# Patient Record
Sex: FEMALE | Race: WHITE | NOT HISPANIC OR LATINO | Employment: UNEMPLOYED | URBAN - NONMETROPOLITAN AREA
[De-identification: names, ages, dates, MRNs, and addresses within clinical notes are randomized per-mention and may not be internally consistent; named-entity substitution may affect disease eponyms.]

---

## 2017-03-01 ENCOUNTER — OFFICE VISIT (OUTPATIENT)
Dept: URGENT CARE | Facility: PHYSICIAN GROUP | Age: 47
End: 2017-03-01
Payer: COMMERCIAL

## 2017-03-01 ENCOUNTER — TELEPHONE (OUTPATIENT)
Dept: URGENT CARE | Facility: PHYSICIAN GROUP | Age: 47
End: 2017-03-01

## 2017-03-01 VITALS
DIASTOLIC BLOOD PRESSURE: 86 MMHG | OXYGEN SATURATION: 97 % | RESPIRATION RATE: 20 BRPM | WEIGHT: 265 LBS | SYSTOLIC BLOOD PRESSURE: 130 MMHG | BODY MASS INDEX: 48.46 KG/M2 | HEART RATE: 105 BPM | TEMPERATURE: 97.7 F

## 2017-03-01 DIAGNOSIS — R11.2 NAUSEA AND VOMITING, INTRACTABILITY OF VOMITING NOT SPECIFIED, UNSPECIFIED VOMITING TYPE: ICD-10-CM

## 2017-03-01 DIAGNOSIS — N76.4 ABSCESS OF LABIA MAJORA: ICD-10-CM

## 2017-03-01 PROCEDURE — 99214 OFFICE O/P EST MOD 30 MIN: CPT | Performed by: PHYSICIAN ASSISTANT

## 2017-03-01 RX ORDER — SULFAMETHOXAZOLE AND TRIMETHOPRIM 800; 160 MG/1; MG/1
1 TABLET ORAL 2 TIMES DAILY
Qty: 20 TAB | Refills: 0 | Status: SHIPPED | OUTPATIENT
Start: 2017-03-01 | End: 2019-02-05

## 2017-03-01 RX ORDER — ONDANSETRON 4 MG/1
4 TABLET, ORALLY DISINTEGRATING ORAL EVERY 8 HOURS PRN
Qty: 20 TAB | Refills: 0 | Status: SHIPPED | OUTPATIENT
Start: 2017-03-01 | End: 2019-02-05

## 2017-03-01 NOTE — MR AVS SNAPSHOT
Gricel Schwab   3/1/2017 12:25 PM   Office Visit   MRN: [id number]    Department:  [facility]   Dept Phone:  [phone]    Description:  Female : 1970   Provider:  Sherie Gonzalez PA-C           Reason for Visit     Cyst vaginal, x5 days      Allergies as of 3/1/2017     Allergen Noted Reactions    Latex 2016       Oxycodone 10/27/2014   Itching    Sesame Oil 2016       hives    Tape 10/27/2014   Hives    Surgical tape; bandaids (blisters)      You were diagnosed with     Abscess of labia majora   [[id number]]         Vital Signs     Blood Pressure Pulse Temperature Respirations Weight Oxygen Saturation    130/86 mmHg 105 36.5 °C (97.7 °F) 20 120.203 kg (265 lb) 97%    Smoking Status                   Former Smoker           Basic Information     Date Of Birth Sex Race Ethnicity Preferred Language    1970 Female White Non- English      Problem List              ICD-10-CM Priority Class Noted - Resolved    Complete rupture of rotator cuff M75.120   10/30/2014 - Present      Health Maintenance        Date Due Completion Dates    IMM DTaP/Tdap/Td Vaccine (1 - Tdap) 1989 ---    PAP SMEAR 1991 ---    MAMMOGRAM 2010 ---    IMM INFLUENZA (1) 2016 ---            Current Immunizations     No immunizations on file.      Below and/or attached are the medications your provider expects you to take. Review all of your home medications and newly ordered medications with your provider and/or pharmacist. Follow medication instructions as directed by your provider and/or pharmacist. Please keep your medication list with you and share with your provider. Update the information when medications are discontinued, doses are changed, or new medications (including over-the-counter products) are added; and carry medication information at all times in the event of emergency situations     Allergies:  LATEX - (reactions not documented)     OXYCODONE - Itching     SESAME OIL -  (reactions not documented)     TAPE - Hives               Medications  Valid as of: March 01, 2017 -  3:00 PM    Generic Name Brand Name Tablet Size Instructions for use    Citalopram Hydrobromide (Tab) CELEXA 10 MG Take 10 mg by mouth every day.        Esomeprazole Magnesium (CAPSULE DELAYED RELEASE) NEXIUM 40 MG Take 40 mg by mouth every day.        GlyBURIDE (Tab) DIABETA 2.5 MG Take 2.5 mg by mouth 2 Times a Day.        Lidocaine HCl (Solution) XYLOCAINE 2 % Apply 5ml topically to the external genitalia as needed for pain        Linagliptin (Tab) TRADJENTA 5 MG Take 5 mg by mouth every day.        MetFORMIN HCl (TABLET SR 24 HR) GLUMETZA 1000 MG Take  by mouth every day.        Ondansetron HCl (Tab) ZOFRAN 4 MG Take 1 Tab by mouth every four hours as needed.        Sulfamethoxazole-Trimethoprim (Tab) BACTRIM -160 MG Take 1 Tab by mouth 2 times a day.        .                 Medicines prescribed today were sent to:     [facility] [address] [place] [address] [address] [facility] [address] [place] [address] & [place]    [address] [facility] [address] [facility] [address]    Phone: [phone] Fax: [phone]    Open 24 Hours?: No      Medication refill instructions:       If your prescription bottle indicates you have medication refills left, it is not necessary to call your provider’s office. Please contact your pharmacy and they will refill your medication.    If your prescription bottle indicates you do not have any refills left, you may request refills at any time through one of the following ways: The online [employer] system (except Urgent Care), by calling your provider’s office, or by asking your pharmacy to contact your provider’s office with a refill request. Medication refills are processed only during regular business hours and may not be available until the next business day. Your provider may request additional information or to have a follow-up visit with you prior to refilling your medication.   *Please  Note: Medication refills are assigned a new Rx number when refilled electronically. Your pharmacy may indicate that no refills were authorized even though a new prescription for the same medication is available at the pharmacy. Please request the medicine by name with the pharmacy before contacting your provider for a refill.           CoTweet Access Code: [email or web address]  Expires: 3/31/2017 12:22 PM    CoTweet  A secure, online tool to manage your health information     [employer]’s CoTweet® is a secure, online tool that connects you to your personalized health information from the privacy of your home -- day or night - making it very easy for you to manage your healthcare. Once the activation process is completed, you can even access your medical information using the CoTweet shanon, which is available for free in the Apple Shanon store or Google Play store.     CoTweet provides the following levels of access (as shown below):   My Chart Features   [facility] Primary Care Doctor [facility]  Specialists [facility]  Urgent  Care Non-[facility]  Primary Care  Doctor   Email your healthcare team securely and privately 24/7 X X X    Manage appointments: schedule your next appointment; view details of past/upcoming appointments X      Request prescription refills. X      View recent personal medical records, including lab and immunizations X X X X   View health record, including health history, allergies, medications X X X X   Read reports about your outpatient visits, procedures, consult and ER notes X X X X   See your discharge summary, which is a recap of your hospital and/or ER visit that includes your diagnosis, lab results, and care plan. X X       How to register for CoTweet:  1. Go to  [email or web address].  2. Click on the Sign Up Now box, which takes you to the New Member Sign Up page. You will need to provide the following information:  a. Enter your CoTweet Access Code exactly as it appears at the top of this page.  (You will not need to use this code after you’ve completed the sign-up process. If you do not sign up before the expiration date, you must request a new code.)   b. Enter your date of birth.   c. Enter your home email address.   d. Click Submit, and follow the next screen’s instructions.  3. Create a Cognitum ID. This will be your Cognitum login ID and cannot be changed, so think of one that is secure and easy to remember.  4. Create a Cognitum password. You can change your password at any time.  5. Enter your Password Reset Question and Answer. This can be used at a later time if you forget your password.   6. Enter your e-mail address. This allows you to receive e-mail notifications when new information is available in Cognitum.  7. Click Sign Up. You can now view your health information.    For assistance activating your Cognitum account, call [phone]

## 2017-03-01 NOTE — PROGRESS NOTES
Chief Complaint   Patient presents with   • Cyst     vaginal, x5 days       HISTORY OF PRESENT ILLNESS: Patient is a 47 y.o. female who presents today for evaluation of a painful area on her left labia majora. She noticed it Saturday night, 2/25. Her  pulled a hair out of the middle of it and it has become progressively more painful and swollen since then. She denies fever, night sweats, chills, drainage from the site. She denies any history of the same symptoms. She denies any vaginal discharge and pain with intercourse.    Patient Active Problem List    Diagnosis Date Noted   • Complete rupture of rotator cuff 10/30/2014       Allergies:Latex; Oxycodone; Sesame oil; and Tape    Current Outpatient Prescriptions Ordered in [facility]   Medication Sig Dispense Refill   • sulfamethoxazole-trimethoprim (BACTRIM DS) 800-160 MG tablet Take 1 Tab by mouth 2 times a day. 20 Tab 0   • lidocaine viscous 2% (XYLOCAINE) 2 % Solution Apply 5ml topically to the external genitalia as needed for pain 100 Bottle 0   • ondansetron (ZOFRAN) 4 MG Tab tablet Take 1 Tab by mouth every four hours as needed. 20 Tab 0   • Metformin HCl 1000 MG TB24 Take  by mouth every day.     • citalopram (CELEXA) 10 MG tablet Take 10 mg by mouth every day.     • esomeprazole (NEXIUM) 40 MG delayed-release capsule Take 40 mg by mouth every day.     • linagliptin (TRADJENTA) 5 MG TABS tablet Take 5 mg by mouth every day.     • glyBURIDE (DIABETA) 2.5 MG TABS Take 2.5 mg by mouth 2 Times a Day.       No current [facility]-ordered facility-administered medications on file.       Past Medical History   Diagnosis Date   • Type II or unspecified type diabetes mellitus without mention of complication, not stated as uncontrolled      oral agents   • Asthma      as child only   • Heart burn    • Pain      right shoulder   • Psychiatric problem      depression   • Sleep apnea      CPAP   • Snoring        Social History   Substance Use Topics   • Smoking status: Former  Smoker -- 0.50 packs/day for 15 years     Quit date: 01/01/2010   • Smokeless tobacco: Not on file   • Alcohol Use: No       No family status information on file.     Family History   Problem Relation Age of Onset   • Diabetes     • Cancer     • Stroke     • Lung Disease     • Heart Disease     • Hypertension         ROS:   Review of Systems   Constitutional: Negative for fever, chills, weight loss and malaise/fatigue.   HENT: Negative for ear pain, nosebleeds, congestion, sore throat and neck pain.    Eyes: Negative for blurred vision.   Respiratory: Negative for cough, sputum production, shortness of breath and wheezing.    Cardiovascular: Negative for chest pain, palpitations, orthopnea and leg swelling.   Gastrointestinal: Negative for heartburn, nausea, vomiting and abdominal pain.   Genitourinary: Negative for dysuria, urgency and frequency.       Exam:  Blood pressure 130/86, pulse 105, temperature 36.5 °C (97.7 °F), resp. rate 20, weight 120.203 kg (265 lb), SpO2 97 %.  General: Normal appearing. No distress.  HEENT:  Head is grossly normal.  Pulmonary: No respiratory distress noted.  Skin:  1.5 cm area of induration on the external surface of the left labia majora. It is tender to palpation with mild erythema. There is no fluctuance noted.  Psych: Normal mood. Alert and oriented x3. Judgment and insight is normal.    I&D not warranted at this time    Assessment/Plan:  Take all medication as directed. Apply heat to the affected area. Discussed appropriate over-the-counter symptomatic medication for pain. Follow-up for worsening or persistent symptoms.  1. Abscess of labia majora  sulfamethoxazole-trimethoprim (BACTRIM DS) 800-160 MG tablet    lidocaine viscous 2% (XYLOCAINE) 2 % Solution

## 2017-03-02 NOTE — TELEPHONE ENCOUNTER
1. Caller Name: Gricel                      Call Back Number: [phone]    2. Message: Patient took her ABX and 30 minutes later she vomited.  She is wondering what else to do.    3. Patient approves office to leave a detailed voicemail/MyChart message: N\A

## 2017-03-02 NOTE — TELEPHONE ENCOUNTER
I sent some nausea medication to the pharmacy. Have her take that 30-45 minutes prior to taking the antibiotic. Follow up for worsening or persistent symptoms.

## 2018-08-24 ENCOUNTER — APPOINTMENT (RX ONLY)
Dept: URBAN - METROPOLITAN AREA CLINIC 4 | Facility: CLINIC | Age: 48
Setting detail: DERMATOLOGY
End: 2018-08-24

## 2018-08-24 DIAGNOSIS — D22 MELANOCYTIC NEVI: ICD-10-CM

## 2018-08-24 DIAGNOSIS — Z71.89 OTHER SPECIFIED COUNSELING: ICD-10-CM

## 2018-08-24 DIAGNOSIS — L81.4 OTHER MELANIN HYPERPIGMENTATION: ICD-10-CM

## 2018-08-24 DIAGNOSIS — L82.1 OTHER SEBORRHEIC KERATOSIS: ICD-10-CM

## 2018-08-24 PROBLEM — L94.5: Status: ACTIVE | Noted: 2018-08-24

## 2018-08-24 PROBLEM — D22.5 MELANOCYTIC NEVI OF TRUNK: Status: ACTIVE | Noted: 2018-08-24

## 2018-08-24 PROCEDURE — ? DIAGNOSIS COMMENT

## 2018-08-24 PROCEDURE — 99203 OFFICE O/P NEW LOW 30 MIN: CPT

## 2018-08-24 PROCEDURE — ? COUNSELING

## 2018-08-24 ASSESSMENT — LOCATION DETAILED DESCRIPTION DERM
LOCATION DETAILED: INFERIOR MID FOREHEAD
LOCATION DETAILED: LEFT ANTERIOR SHOULDER
LOCATION DETAILED: RIGHT ANTERIOR PROXIMAL UPPER ARM
LOCATION DETAILED: EPIGASTRIC SKIN
LOCATION DETAILED: INFERIOR THORACIC SPINE
LOCATION DETAILED: LEFT MEDIAL UPPER BACK
LOCATION DETAILED: RIGHT CENTRAL ZYGOMA

## 2018-08-24 ASSESSMENT — LOCATION SIMPLE DESCRIPTION DERM
LOCATION SIMPLE: RIGHT ZYGOMA
LOCATION SIMPLE: UPPER BACK
LOCATION SIMPLE: RIGHT UPPER ARM
LOCATION SIMPLE: LEFT SHOULDER
LOCATION SIMPLE: INFERIOR FOREHEAD
LOCATION SIMPLE: ABDOMEN
LOCATION SIMPLE: LEFT UPPER BACK

## 2018-08-24 ASSESSMENT — LOCATION ZONE DERM
LOCATION ZONE: ARM
LOCATION ZONE: TRUNK
LOCATION ZONE: FACE

## 2019-02-05 ENCOUNTER — OFFICE VISIT (OUTPATIENT)
Dept: URGENT CARE | Facility: PHYSICIAN GROUP | Age: 49
End: 2019-02-05
Payer: COMMERCIAL

## 2019-02-05 VITALS
DIASTOLIC BLOOD PRESSURE: 86 MMHG | SYSTOLIC BLOOD PRESSURE: 138 MMHG | RESPIRATION RATE: 16 BRPM | WEIGHT: 202 LBS | TEMPERATURE: 97.5 F | OXYGEN SATURATION: 99 % | HEART RATE: 76 BPM | BODY MASS INDEX: 36.95 KG/M2

## 2019-02-05 DIAGNOSIS — N39.0 ACUTE URINARY TRACT INFECTION: ICD-10-CM

## 2019-02-05 LAB
APPEARANCE UR: NORMAL
BILIRUB UR STRIP-MCNC: NEGATIVE MG/DL
COLOR UR AUTO: NORMAL
GLUCOSE UR STRIP.AUTO-MCNC: NEGATIVE MG/DL
KETONES UR STRIP.AUTO-MCNC: NEGATIVE MG/DL
LEUKOCYTE ESTERASE UR QL STRIP.AUTO: NORMAL
NITRITE UR QL STRIP.AUTO: POSITIVE
PH UR STRIP.AUTO: 6 [PH] (ref 5–8)
PROT UR QL STRIP: 30 MG/DL
RBC UR QL AUTO: NORMAL
SP GR UR STRIP.AUTO: 1.02
UROBILINOGEN UR STRIP-MCNC: 0.2 MG/DL

## 2019-02-05 PROCEDURE — 99214 OFFICE O/P EST MOD 30 MIN: CPT | Performed by: FAMILY MEDICINE

## 2019-02-05 PROCEDURE — 81002 URINALYSIS NONAUTO W/O SCOPE: CPT | Performed by: FAMILY MEDICINE

## 2019-02-05 RX ORDER — NITROFURANTOIN 25; 75 MG/1; MG/1
100 CAPSULE ORAL EVERY 12 HOURS
Qty: 10 CAP | Refills: 0 | Status: SHIPPED | OUTPATIENT
Start: 2019-02-05 | End: 2019-02-10

## 2019-04-23 ENCOUNTER — HOSPITAL ENCOUNTER (EMERGENCY)
Facility: MEDICAL CENTER | Age: 49
End: 2019-04-23
Payer: COMMERCIAL

## 2021-02-23 NOTE — PROGRESS NOTES
Chief Complaint:    Chief Complaint   Patient presents with   • UTI     pain upon urination        History of Present Illness:    This is a new problem. Symptoms since last night. Has dysuria. No urinary frequency, urinary urgency, or hematuria. Feels like typical UTI. Last UTI was about 4-5 years ago, cannot recall antibiotic used. She has only had one prior UTI.      Review of Systems:    Constitutional: Negative for fever, chills, and diaphoresis.   Eyes: Negative for change in vision, photophobia, pain, redness, and discharge.  ENT: Negative for ear pain, ear discharge, hearing loss, tinnitus, nasal congestion, nosebleeds, and sore throat.    Respiratory: Negative for cough, hemoptysis, sputum production, shortness of breath, wheezing, and stridor.    Cardiovascular: Negative for chest pain, palpitations, orthopnea, claudication, leg swelling, and PND.   Gastrointestinal: Negative for abdominal pain, nausea, vomiting, diarrhea, constipation, blood in stool, and melena.   Genitourinary: See HPI.  Musculoskeletal: Negative for myalgias, joint pain, neck pain, and back pain.   Skin: Negative for rash and itching.   Neurological: Negative for dizziness, tingling, tremors, sensory change, speech change, focal weakness, seizures, loss of consciousness, and headaches.   Endo: Diabetic, on medication.  Heme: Does not bruise/bleed easily.   Psychiatric/Behavioral: No new symptoms.      Past Medical History:    Past Medical History:   Diagnosis Date   • Asthma     as child only   • Heart burn    • Pain     right shoulder   • Psychiatric problem     depression   • Sleep apnea     CPAP   • Snoring    • Type II or unspecified type diabetes mellitus without mention of complication, not stated as uncontrolled     oral agents     Past Surgical History:    Past Surgical History:   Procedure Laterality Date   • SHOULDER DECOMPRESSION ARTHROSCOPIC  10/30/2014    Performed by Enrique Galloway M.D. at [facility]   •  [FreeTextEntry1] : Needs Pennsaid and Lidocaine patch CLAVICLE DISTAL EXCISION  10/30/2014    Performed by Enrique Galloway M.D. at [facility]   • ARTHROSCOPIC LABRAL DEBRIDEMENT  10/30/2014    Performed by Enrique Galloway M.D. at SURGERY [facility]   • LORNA BY LAPAROSCOPY  2011   • ANKLE ARTHROTOMY  2010    right   • SHOULDER ARTHROSCOPY  2009    left   • LUMBAR LAMINECTOMY DISKECTOMY  2004     Social History:    Social History     Social History   • Marital status:      Spouse name: N/A   • Number of children: N/A   • Years of education: N/A     Occupational History   • Not on file.     Social History Main Topics   • Smoking status: Former Smoker     Packs/day: 0.50     Years: 15.00     Quit date: 1/1/2010   • Smokeless tobacco: Never Used   • Alcohol use No   • Drug use: No   • Sexual activity: Not on file     Other Topics Concern   • Not on file     Social History Narrative   • No narrative on file     Family History:    Family History   Problem Relation Age of Onset   • Diabetes Unknown    • Cancer Unknown    • Stroke Unknown    • Lung Disease Unknown    • Heart Disease Unknown    • Hypertension Unknown      Medications:    Current Outpatient Prescriptions on File Prior to Visit   Medication Sig Dispense Refill   • esomeprazole (NEXIUM) 40 MG delayed-release capsule Take 40 mg by mouth every day.     • Metformin HCl 1000 MG TB24 Take  by mouth every day.     • citalopram (CELEXA) 10 MG tablet Take 10 mg by mouth every day.     • linagliptin (TRADJENTA) 5 MG TABS tablet Take 5 mg by mouth every day.     • glyBURIDE (DIABETA) 2.5 MG TABS Take 2.5 mg by mouth 2 Times a Day.       No current facility-administered medications on file prior to visit.      Allergies:    Allergies   Allergen Reactions   • Latex    • Oxycodone Itching   • Sesame Oil      hives   • Tape Hives     Surgical tape; bandaids (blisters)       Vitals:    Vitals:    02/05/19 1101   BP: 138/86   Pulse: 76   Resp: 16   Temp: 36.4 °C (97.5 °F)   SpO2: 99%   Weight: 91.6 kg  (202 lb)       Physical Exam:    Constitutional: Vital signs reviewed. Appears well-developed and well-nourished. No acute distress.   Eyes: Sclera white, conjunctivae clear.   ENT: External ears normal. Hearing normal.  Neck: Neck supple.   Pulmonary/Chest: Respirations non-labored.   Abdomen: Bowel sounds are normal active. Soft, non-distended, and non-tender to palpation.    Musculoskeletal: No CVA TTP bilaterally. Normal gait. Normal range of motion. No muscular atrophy or weakness.  Neurological: Alert and oriented to person, place, and time. Muscle tone normal. Coordination normal. Light touch and sensation normal.   Skin: No rashes or lesions. Warm, dry, normal turgor.  Psychiatric: Normal mood and affect. Behavior is normal. Judgment and thought content normal.     Diagnostics:    POCT URINALYSIS (Order #[id number]) on 2/5/19   Component Results     Component Value Ref Range & Units Status   POC Color DARK YELLOW  Negative Final   POC Appearance CLOUDY  Negative Final   POC Leukocyte Esterase MODERATE  Negative Final   POC Nitrites POSITIVE  Negative Final   POC Urobiligen 0.2  Negative (0.2) mg/dL Final   POC Protein 30  Negative mg/dL Final   POC Urine PH 6.0  5.0 - 8.0 Final   POC Blood LARGE  Negative Final   POC Specific Gravity 1.020  <1.005 - >1.030 Final   POC Ketones NEGATIVE  Negative mg/dL Final   POC Bilirubin NEGATIVE  Negative mg/dL Final   POC Glucose NEGATIVE  Negative mg/dL Final   Last Resulted Time   Tue Feb 5, 2019 11:27 AM       Assessment / Plan:    1. Acute urinary tract infection  - POCT Urinalysis  - nitrofurantoin monohydr macro (MACROBID) 100 MG Cap; Take 1 Cap by mouth every 12 hours for 5 days.  Dispense: 10 Cap; Refill: 0      Discussed with her DDX, management options, and risks, benefits, and alternatives to treatment plan agreed upon.    Declines urine culture.    May use OTC Azo prn.    Agreeable to medication prescribed.    Discussed expected course of duration, time for  improvement, and to seek follow-up in Emergency Room, urgent care, or with PCP if getting worse at any time or not improving within expected time frame.

## 2021-08-10 ENCOUNTER — OFFICE VISIT (OUTPATIENT)
Dept: URGENT CARE | Facility: PHYSICIAN GROUP | Age: 51
End: 2021-08-10
Payer: COMMERCIAL

## 2021-08-10 VITALS
SYSTOLIC BLOOD PRESSURE: 132 MMHG | TEMPERATURE: 98.5 F | OXYGEN SATURATION: 97 % | WEIGHT: 163 LBS | HEIGHT: 64 IN | BODY MASS INDEX: 27.83 KG/M2 | HEART RATE: 75 BPM | RESPIRATION RATE: 18 BRPM | DIASTOLIC BLOOD PRESSURE: 82 MMHG

## 2021-08-10 DIAGNOSIS — R07.89 CHEST TIGHTNESS: ICD-10-CM

## 2021-08-10 DIAGNOSIS — R06.02 SOB (SHORTNESS OF BREATH): ICD-10-CM

## 2021-08-10 DIAGNOSIS — J45.901 EXACERBATION OF ASTHMA, UNSPECIFIED ASTHMA SEVERITY, UNSPECIFIED WHETHER PERSISTENT: ICD-10-CM

## 2021-08-10 PROCEDURE — 99214 OFFICE O/P EST MOD 30 MIN: CPT | Performed by: PHYSICIAN ASSISTANT

## 2021-08-10 RX ORDER — ALBUTEROL SULFATE 90 UG/1
2 AEROSOL, METERED RESPIRATORY (INHALATION) EVERY 6 HOURS PRN
Qty: 8.5 G | Refills: 2 | Status: SHIPPED | OUTPATIENT
Start: 2021-08-10

## 2021-08-10 RX ORDER — PREDNISONE 10 MG/1
TABLET ORAL
Qty: 15 TABLET | Refills: 0 | Status: SHIPPED | OUTPATIENT
Start: 2021-08-10 | End: 2022-06-20

## 2021-08-10 RX ORDER — ALBUTEROL SULFATE 2.5 MG/3ML
2.5 SOLUTION RESPIRATORY (INHALATION) EVERY 4 HOURS PRN
Qty: 30 EACH | Refills: 0 | Status: SHIPPED | OUTPATIENT
Start: 2021-08-10

## 2021-08-10 RX ORDER — VENLAFAXINE HYDROCHLORIDE 75 MG/1
75 CAPSULE, EXTENDED RELEASE ORAL DAILY
COMMUNITY
Start: 2021-05-28 | End: 2021-08-10

## 2021-08-10 NOTE — PROGRESS NOTES
"Chief Complaint   Patient presents with   • Asthma     excerbation d/t smoke--needs a refill inhaler       HISTORY OF PRESENT ILLNESS: Patient is a 51 y.o. female who presents today for the following:    SOB x 1 week  Chest tightness  History of asthma, on medication  Patient's father and sister both had \" maker heart attacks\" at age 35  Denies fever, chills, body aches    Patient Active Problem List    Diagnosis Date Noted   • Complete rupture of rotator cuff 10/30/2014       Allergies:Latex, Oxycodone, Sesame oil, and Tape    Current Outpatient Medications Ordered in Epic   Medication Sig Dispense Refill   • albuterol (PROVENTIL) 2.5mg/3ml Nebu Soln solution for nebulization Take 3 mL by nebulization every four hours as needed for Shortness of Breath. 30 Each 0   • albuterol 108 (90 Base) MCG/ACT Aero Soln inhalation aerosol Inhale 2 Puffs every 6 hours as needed for Shortness of Breath. 8.5 g 2   • ipratropium (ATROVENT) 0.02 % Solution Take 1 mL by nebulization every four hours as needed (shortness of breath). 30 Each 0   • predniSONE (DELTASONE) 10 MG Tab 50 mg x 1 day; 40 mg x 1 day; 30 mg x 1 day; 20 mg x 1 day; 10 mg x 1 day 15 tablet 0     No current Epic-ordered facility-administered medications on file.       Past Medical History:   Diagnosis Date   • Asthma     as child only   • Heart burn    • Pain     right shoulder   • Psychiatric problem     depression   • Sleep apnea     CPAP   • Snoring    • Type II or unspecified type diabetes mellitus without mention of complication, not stated as uncontrolled     oral agents       Social History     Tobacco Use   • Smoking status: Former Smoker     Packs/day: 0.50     Years: 15.00     Pack years: 7.50     Quit date: 2010     Years since quittin.6   • Smokeless tobacco: Never Used   Substance Use Topics   • Alcohol use: No   • Drug use: No       Family Status   Relation Name Status   • Unknown  (Not Specified)   • Unknown  (Not Specified)   • " "Unknown  (Not Specified)   • Unknown  (Not Specified)   • Unknown  (Not Specified)   • Unknown  (Not Specified)     Family History   Problem Relation Age of Onset   • Diabetes Unknown    • Cancer Unknown    • Stroke Unknown    • Lung Disease Unknown    • Heart Disease Unknown    • Hypertension Unknown        Review of Systems:   Constitutional ROS: No unexpected change in weight  Pulmonary ROS: No chronic cough, sputum, or hemoptysis, No dyspnea on exertion, No wheezing  Cardiovascular ROS: No diaphoresis, No edema, No palpitations  Hematologic/Lymphatic ROS: No chills, No night sweats, No weight loss  Skin/Integumentary ROS: No edema, No evidence of rash, No itching      Exam:  /82   Pulse 75   Temp 36.9 °C (98.5 °F)   Resp 18   Ht 1.626 m (5' 4\")   Wt 73.9 kg (163 lb)   SpO2 97%   General: Well developed, well nourished. No distress.    Eye: PERRL/EOMI; conjunctivae clear, lids normal.  ENMT: Lips without lesions, MMM. Oropharynx is clear. Bilateral TMs are within normal limits.  Pulmonary: Unlabored respiratory effort. Lungs clear to auscultation, no wheezes, no rhonchi.    Cardiovascular: Regular rate and rhythm without murmur.   Neurologic: Grossly nonfocal. No facial asymmetry noted.  Lymph: No cervical lymphadenopathy noted.  Skin: Warm, dry, good turgor. No rashes in visible areas.   Psych: Normal mood. Alert and oriented to person, place and time.    EKG, per my interpretation: Normal sinus rhythm, bradycardic rate.  Normal axis.  No ST elevation/depression.    Assessment/Plan:  HPI, exam findings are consistent with asthma exacerbation.  Patient declines cardiology referral at this time but will follow up with her primary care provider.  Use all medication as directed.  1. Exacerbation of asthma, unspecified asthma severity, unspecified whether persistent  albuterol (PROVENTIL) 2.5mg/3ml Nebu Soln solution for nebulization    albuterol 108 (90 Base) MCG/ACT Aero Soln inhalation aerosol    " ipratropium (ATROVENT) 0.02 % Solution   2. SOB (shortness of breath)  albuterol (PROVENTIL) 2.5mg/3ml Nebu Soln solution for nebulization    albuterol 108 (90 Base) MCG/ACT Aero Soln inhalation aerosol    ipratropium (ATROVENT) 0.02 % Solution   3. Chest tightness  predniSONE (DELTASONE) 10 MG Tab

## 2022-06-20 ENCOUNTER — OFFICE VISIT (OUTPATIENT)
Dept: URGENT CARE | Facility: PHYSICIAN GROUP | Age: 52
End: 2022-06-20
Payer: COMMERCIAL

## 2022-06-20 VITALS
HEIGHT: 68 IN | TEMPERATURE: 97.3 F | OXYGEN SATURATION: 100 % | DIASTOLIC BLOOD PRESSURE: 68 MMHG | RESPIRATION RATE: 14 BRPM | WEIGHT: 164 LBS | BODY MASS INDEX: 24.86 KG/M2 | SYSTOLIC BLOOD PRESSURE: 114 MMHG | HEART RATE: 66 BPM

## 2022-06-20 DIAGNOSIS — G47.30 SLEEP APNEA, UNSPECIFIED TYPE: ICD-10-CM

## 2022-06-20 DIAGNOSIS — U07.1 COVID-19: ICD-10-CM

## 2022-06-20 DIAGNOSIS — J45.20 MILD INTERMITTENT ASTHMA WITHOUT COMPLICATION: ICD-10-CM

## 2022-06-20 PROCEDURE — 99213 OFFICE O/P EST LOW 20 MIN: CPT | Performed by: FAMILY MEDICINE

## 2022-06-20 NOTE — PROGRESS NOTES
"  Subjective:      52 y.o. female presents to urgent care for COVID-19. She tested positive on a home test yesterday, but symptoms started on Friday. She is experiencing increased sinus pressure, headache, body aches, fever, cough, sore throat, and diarrhea. She has been using airborne and Tylenol with minimal relief in symptoms. She does have sleep apnea which she uses a CPAP for. She also has asthma for which she takes Atrovent and Albuterol. She denies any tobacco product use.  She is fully vaccinated against COVID.  She has had no known sick contacts.    She denies any other questions or concerns at this time.    Current problem list, medication, and past medical/surgical history were reviewed in Epic.    ROS  See HPI     Objective:      /68 (BP Location: Left arm, Patient Position: Sitting, BP Cuff Size: Adult)   Pulse 66   Temp 36.3 °C (97.3 °F) (Temporal)   Resp 14   Ht 1.727 m (5' 8\")   Wt 74.4 kg (164 lb)   SpO2 100%   BMI 24.94 kg/m²     Physical Exam  Constitutional:       General: She is not in acute distress.     Appearance: She is not diaphoretic.   Cardiovascular:      Rate and Rhythm: Normal rate and regular rhythm.      Heart sounds: Normal heart sounds.   Pulmonary:      Effort: Pulmonary effort is normal. No respiratory distress.      Breath sounds: Normal breath sounds.   Neurological:      Mental Status: She is alert.   Psychiatric:         Mood and Affect: Affect normal.         Judgment: Judgment normal.       Assessment/Plan:     1. COVID-19  2. Mild intermittent asthma without complication  3. Sleep apnea, unspecified type  Paxlovid (nirmatrelvir and ritonavir) is an FDA emergency use authorization drug intended for COVID-positive patients at high risk for severe disease including hospital and death; not FDA approved. Patient meets qualifications for this medication due to their history of asthma and sleep apnea. Some adverse effects include: Hypersensitivity reaction, " anaphylaxis, hepatotoxicity, hepatitis, jaundice, diarrhea and / or others up to and including death. We only have one GFR on file from 10/30/201, it was >60.   - Nirmatrelvir & Ritonavir 20 x 150 MG & 10 x 100MG Tablet Therapy Pack; Take 300 mg nirmatrelvir (two 150 mg tablets) with 100 mg  ritonavir (one 100 mg tablet) by mouth, with all three tablets taken together  twice daily for 5 days.  Dispense: 30 Each; Refill: 0        Instructed to return to Urgent Care or nearest Emergency Department if symptoms fail to improve, for any change in condition, further concerns, or new concerning symptoms. Patient states understanding of the plan of care and discharge instructions.    Katia Bergeron M.D.

## 2022-09-23 ENCOUNTER — OFFICE VISIT (OUTPATIENT)
Dept: URGENT CARE | Facility: PHYSICIAN GROUP | Age: 52
End: 2022-09-23
Payer: COMMERCIAL

## 2022-09-23 VITALS
DIASTOLIC BLOOD PRESSURE: 94 MMHG | OXYGEN SATURATION: 98 % | SYSTOLIC BLOOD PRESSURE: 132 MMHG | RESPIRATION RATE: 16 BRPM | TEMPERATURE: 98.1 F | HEART RATE: 67 BPM | WEIGHT: 184 LBS | BODY MASS INDEX: 27.98 KG/M2

## 2022-09-23 DIAGNOSIS — L50.9 URTICARIA: ICD-10-CM

## 2022-09-23 PROCEDURE — 99213 OFFICE O/P EST LOW 20 MIN: CPT | Performed by: STUDENT IN AN ORGANIZED HEALTH CARE EDUCATION/TRAINING PROGRAM

## 2022-09-23 RX ORDER — VENLAFAXINE HYDROCHLORIDE 75 MG/1
75 CAPSULE, EXTENDED RELEASE ORAL DAILY
COMMUNITY
Start: 2022-09-16

## 2022-09-23 RX ORDER — PREDNISONE 20 MG/1
20 TABLET ORAL DAILY
Qty: 5 TABLET | Refills: 0 | Status: SHIPPED | OUTPATIENT
Start: 2022-09-23 | End: 2022-09-28

## 2022-09-23 RX ORDER — HYDROXYZINE HYDROCHLORIDE 25 MG/1
25 TABLET, FILM COATED ORAL 3 TIMES DAILY PRN
Qty: 30 TABLET | Refills: 0 | Status: SHIPPED | OUTPATIENT
Start: 2022-09-23

## 2022-09-23 RX ORDER — SOD SULF/POT CHLORIDE/MAG SULF 1.479 G
TABLET ORAL
COMMUNITY
Start: 2022-09-21

## 2022-09-23 NOTE — PROGRESS NOTES
Subjective:   Gricel Schwab is a 52 y.o. female who presents for Urticaria (X2days )      HPI:  Pleasant 52-year-old female presents the clinic for 2 days of hives.  Patient reports that she has had this in the past after having a salad wrap and believes it is due to sesame oil.  She recently had a lettuce wrap 2 days ago and she believes that had sesame oil in it as well.  She started developing hives a couple hours after having this meal.  She reports that the rash is very itchy and is diffusely throughout her body.  She denies fever, chills, cough, sputum production, shortness of breath, wheezing, facial swelling, throat swelling, nausea, vomiting, abdominal pain, chest pain, palpitations, lower leg swelling, dizziness, or headache.  She has taken 3 doses of Benadryl with mild relief from her itching.        Medications:    albuterol Aers  albuterol Nebu  hydrOXYzine HCl Tabs  ipratropium Soln  predniSONE Tabs  Sutab Tabs  venlafaxine XR Cp24    Allergies: Latex, Oxycodone, Sesame oil, and Tape    Problem List: Gricel Schwab does not have any pertinent problems on file.    Surgical History:  Past Surgical History:   Procedure Laterality Date    SHOULDER DECOMPRESSION ARTHROSCOPIC  10/30/2014    Performed by Enrique Galloway M.D. at SURGERY [facility]    CLAVICLE DISTAL EXCISION  10/30/2014    Performed by Enrique Galloway M.D. at SURGERY [facility]    DEBRIDEMENT, LABRUM, HIP, ARTHROSCOPIC  10/30/2014    Performed by Enrique Galloway M.D. at SURGERY [facility]    LORNA BY LAPAROSCOPY  2011    ANKLE ARTHROTOMY  2010    right    SHOULDER ARTHROSCOPY  2009    left    LUMBAR LAMINECTOMY DISKECTOMY  2004       Past Social Hx: Gricel Schwab  reports that she quit smoking about 12 years ago. She has a 7.50 pack-year smoking history. She has never used smokeless tobacco. She reports that she does not drink alcohol and does not use drugs.     Past Family Hx:  Gricel Schwab family history includes Cancer in  her unknown relative; Diabetes in her unknown relative; Heart Disease in her unknown relative; Hypertension in her unknown relative; Lung Disease in her unknown relative; Stroke in her unknown relative.     Problem list, medications, and allergies reviewed by myself today in Epic.     Objective:     BP (!) 132/94   Pulse 67   Temp 36.7 °C (98.1 °F) (Temporal)   Resp 16   Wt 83.5 kg (184 lb)   SpO2 98%   BMI 27.98 kg/m²     Physical Exam  Vitals reviewed.   Constitutional:       General: She is not in acute distress.     Appearance: Normal appearance.   HENT:      Head: Normocephalic.      Right Ear: Tympanic membrane, ear canal and external ear normal.      Left Ear: Tympanic membrane, ear canal and external ear normal.      Nose: Nose normal.      Mouth/Throat:      Mouth: Mucous membranes are moist.      Comments: No angioedema or throat swelling.  No facial swelling.  Eyes:      Conjunctiva/sclera: Conjunctivae normal.      Pupils: Pupils are equal, round, and reactive to light.   Cardiovascular:      Rate and Rhythm: Normal rate and regular rhythm.      Pulses: Normal pulses.      Heart sounds: Normal heart sounds. No murmur heard.  Pulmonary:      Effort: Pulmonary effort is normal. No respiratory distress.      Breath sounds: Normal breath sounds. No stridor. No wheezing, rhonchi or rales.   Musculoskeletal:      Cervical back: Normal range of motion.   Lymphadenopathy:      Cervical: No cervical adenopathy.   Skin:     General: Skin is warm and dry.      Capillary Refill: Capillary refill takes less than 2 seconds.      Findings: Rash present. No erythema or lesion.      Comments: Patient has red raised urticarial rash diffusely on the body with no signs of secondary bacterial infection.   Neurological:      General: No focal deficit present.      Mental Status: She is alert and oriented to person, place, and time.       Assessment/Plan:     Diagnosis and associated orders:     1. Urticaria  predniSONE  (DELTASONE) 20 MG Tab    hydrOXYzine HCl (ATARAX) 25 MG Tab         Comments/MDM:     Patient's presentation physical exam findings are consistent with urticarial rash believed to be due to food allergy, possibly sesame seed oil.  Patient has no respiratory distress or signs of anaphylaxis.  Her biggest complaint is how itchy her rashes.  She has taken Benadryl multiple times with some relief from the itchiness but has not fully resolved.  She will be treated with prednisone and hydroxyzine.  Patient was educated on the use of these medications and the possible side effects including allergic reaction.  Patient has good understanding of this and is agreeable to the plan.  No red flag symptoms.  Rash does not appear to be infectious or bacterial.  Vitals all within normal limits and she is afebrile.  Strict ED/return precautions were given.  Patient has good understanding the signs and symptoms of warrant immediate reevaluation.         Differential diagnosis, natural history, supportive care, and indications for immediate follow-up discussed.    Advised the patient to follow-up with the primary care physician for recheck, reevaluation, and consideration of further management.    Please note that this dictation was created using voice recognition software. I have made a reasonable attempt to correct obvious errors, but I expect that there are errors of grammar and possibly content that I did not discover before finalizing the note.    Electronically signed by Arcenio Vincent PA-C.

## 2023-03-31 ENCOUNTER — APPOINTMENT (RX ONLY)
Dept: URBAN - METROPOLITAN AREA CLINIC 35 | Facility: CLINIC | Age: 53
Setting detail: DERMATOLOGY
End: 2023-03-31

## 2023-03-31 DIAGNOSIS — L82.1 OTHER SEBORRHEIC KERATOSIS: ICD-10-CM

## 2023-03-31 DIAGNOSIS — L81.4 OTHER MELANIN HYPERPIGMENTATION: ICD-10-CM

## 2023-03-31 DIAGNOSIS — D22 MELANOCYTIC NEVI: ICD-10-CM

## 2023-03-31 DIAGNOSIS — Z71.89 OTHER SPECIFIED COUNSELING: ICD-10-CM

## 2023-03-31 DIAGNOSIS — L21.8 OTHER SEBORRHEIC DERMATITIS: ICD-10-CM

## 2023-03-31 PROBLEM — D22.5 MELANOCYTIC NEVI OF TRUNK: Status: ACTIVE | Noted: 2023-03-31

## 2023-03-31 PROCEDURE — 99203 OFFICE O/P NEW LOW 30 MIN: CPT

## 2023-03-31 PROCEDURE — ? COUNSELING

## 2023-03-31 PROCEDURE — ? PRESCRIPTION

## 2023-03-31 RX ORDER — HYDROCORTISONE 25 MG/G
1 CREAM TOPICAL
Qty: 30 | Refills: 1 | Status: ERX | COMMUNITY
Start: 2023-03-31

## 2023-03-31 RX ADMIN — HYDROCORTISONE 1: 25 CREAM TOPICAL at 00:00

## 2023-03-31 ASSESSMENT — LOCATION SIMPLE DESCRIPTION DERM
LOCATION SIMPLE: RIGHT CHEEK
LOCATION SIMPLE: UPPER BACK
LOCATION SIMPLE: INFERIOR FOREHEAD
LOCATION SIMPLE: LEFT SHOULDER
LOCATION SIMPLE: LEFT UPPER BACK
LOCATION SIMPLE: RIGHT ZYGOMA
LOCATION SIMPLE: ABDOMEN
LOCATION SIMPLE: LEFT CHEEK
LOCATION SIMPLE: RIGHT UPPER ARM

## 2023-03-31 ASSESSMENT — LOCATION ZONE DERM
LOCATION ZONE: ARM
LOCATION ZONE: TRUNK
LOCATION ZONE: FACE

## 2023-03-31 ASSESSMENT — SEVERITY ASSESSMENT: HOW SEVERE IS THIS PATIENT'S CONDITION?: MILD

## 2023-03-31 ASSESSMENT — LOCATION DETAILED DESCRIPTION DERM
LOCATION DETAILED: LEFT ANTERIOR SHOULDER
LOCATION DETAILED: RIGHT SUPERIOR MEDIAL MALAR CHEEK
LOCATION DETAILED: INFERIOR MID FOREHEAD
LOCATION DETAILED: LEFT SUPERIOR MEDIAL MALAR CHEEK
LOCATION DETAILED: LEFT MEDIAL UPPER BACK
LOCATION DETAILED: INFERIOR THORACIC SPINE
LOCATION DETAILED: RIGHT CENTRAL ZYGOMA
LOCATION DETAILED: RIGHT ANTERIOR PROXIMAL UPPER ARM
LOCATION DETAILED: EPIGASTRIC SKIN